# Patient Record
Sex: FEMALE | Race: WHITE | Employment: FULL TIME | URBAN - NONMETROPOLITAN AREA
[De-identification: names, ages, dates, MRNs, and addresses within clinical notes are randomized per-mention and may not be internally consistent; named-entity substitution may affect disease eponyms.]

---
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
## 2020-12-13 ENCOUNTER — APPOINTMENT (OUTPATIENT)
Dept: ULTRASOUND IMAGING | Age: 20
End: 2020-12-13
Payer: COMMERCIAL

## 2020-12-13 ENCOUNTER — APPOINTMENT (OUTPATIENT)
Dept: CT IMAGING | Age: 20
End: 2020-12-13
Payer: COMMERCIAL

## 2020-12-13 ENCOUNTER — HOSPITAL ENCOUNTER (EMERGENCY)
Age: 20
Discharge: HOME | End: 2020-12-13
Payer: COMMERCIAL

## 2020-12-13 VITALS
DIASTOLIC BLOOD PRESSURE: 70 MMHG | HEART RATE: 90 BPM | OXYGEN SATURATION: 100 % | TEMPERATURE: 98.1 F | SYSTOLIC BLOOD PRESSURE: 108 MMHG | RESPIRATION RATE: 16 BRPM

## 2020-12-13 LAB
ALBUMIN SERPL-MCNC: 4.2 G/DL (ref 3.5–5.1)
ALP BLD-CCNC: 66 U/L (ref 38–126)
ALT SERPL-CCNC: 11 U/L (ref 11–66)
ANION GAP SERPL CALCULATED.3IONS-SCNC: 12 MEQ/L (ref 8–16)
AST SERPL-CCNC: 15 U/L (ref 5–40)
BACTERIA: ABNORMAL /HPF
BASOPHILS # BLD: 0.5 %
BASOPHILS ABSOLUTE: 0.1 THOU/MM3 (ref 0–0.1)
BILIRUB SERPL-MCNC: 0.2 MG/DL (ref 0.3–1.2)
BILIRUBIN URINE: NEGATIVE
BLOOD, URINE: ABNORMAL
BUN BLDV-MCNC: 10 MG/DL (ref 7–22)
C-REACTIVE PROTEIN: 0.5 MG/DL (ref 0–1)
CALCIUM SERPL-MCNC: 9.2 MG/DL (ref 8.5–10.5)
CASTS 2: ABNORMAL /LPF
CASTS UA: ABNORMAL /LPF
CHARACTER, URINE: CLEAR
CHLORIDE BLD-SCNC: 105 MEQ/L (ref 98–111)
CO2: 21 MEQ/L (ref 23–33)
COLOR: YELLOW
CREAT SERPL-MCNC: 0.7 MG/DL (ref 0.4–1.2)
CRYSTALS, UA: ABNORMAL
EOSINOPHIL # BLD: 1.3 %
EOSINOPHILS ABSOLUTE: 0.1 THOU/MM3 (ref 0–0.4)
EPITHELIAL CELLS, UA: ABNORMAL /HPF
ERYTHROCYTE [DISTWIDTH] IN BLOOD BY AUTOMATED COUNT: 12.3 % (ref 11.5–14.5)
ERYTHROCYTE [DISTWIDTH] IN BLOOD BY AUTOMATED COUNT: 43.3 FL (ref 35–45)
GFR SERPL CREATININE-BSD FRML MDRD: > 90 ML/MIN/1.73M2
GLUCOSE BLD-MCNC: 79 MG/DL (ref 70–108)
GLUCOSE URINE: NEGATIVE MG/DL
HCT VFR BLD CALC: 43.6 % (ref 37–47)
HEMOGLOBIN: 14.3 GM/DL (ref 12–16)
IMMATURE GRANS (ABS): 0.07 THOU/MM3 (ref 0–0.07)
IMMATURE GRANULOCYTES: 0.6 %
KETONES, URINE: NEGATIVE
LEUKOCYTE ESTERASE, URINE: NEGATIVE
LYMPHOCYTES # BLD: 15.9 %
LYMPHOCYTES ABSOLUTE: 1.8 THOU/MM3 (ref 1–4.8)
MCH RBC QN AUTO: 31.6 PG (ref 26–33)
MCHC RBC AUTO-ENTMCNC: 32.8 GM/DL (ref 32.2–35.5)
MCV RBC AUTO: 96.5 FL (ref 81–99)
MISCELLANEOUS 2: ABNORMAL
MONOCYTES # BLD: 7.3 %
MONOCYTES ABSOLUTE: 0.8 THOU/MM3 (ref 0.4–1.3)
NITRITE, URINE: NEGATIVE
NUCLEATED RED BLOOD CELLS: 0 /100 WBC
OSMOLALITY CALCULATION: 273.6 MOSMOL/KG (ref 275–300)
PH UA: 8 (ref 5–9)
PLATELET # BLD: 251 THOU/MM3 (ref 130–400)
PMV BLD AUTO: 12.2 FL (ref 9.4–12.4)
POTASSIUM REFLEX MAGNESIUM: 4.1 MEQ/L (ref 3.5–5.2)
PREGNANCY, SERUM: NEGATIVE
PROTEIN UA: NEGATIVE
RBC # BLD: 4.52 MILL/MM3 (ref 4.2–5.4)
RBC URINE: ABNORMAL /HPF
RENAL EPITHELIAL, UA: ABNORMAL
SEG NEUTROPHILS: 74.4 %
SEGMENTED NEUTROPHILS ABSOLUTE COUNT: 8.4 THOU/MM3 (ref 1.8–7.7)
SODIUM BLD-SCNC: 138 MEQ/L (ref 135–145)
SPECIFIC GRAVITY, URINE: 1.02 (ref 1–1.03)
TOTAL PROTEIN: 7.2 G/DL (ref 6.1–8)
UROBILINOGEN, URINE: 1 EU/DL (ref 0–1)
WBC # BLD: 11.3 THOU/MM3 (ref 4.8–10.8)
WBC UA: ABNORMAL /HPF
YEAST: ABNORMAL

## 2020-12-13 PROCEDURE — 80053 COMPREHEN METABOLIC PANEL: CPT

## 2020-12-13 PROCEDURE — 76705 ECHO EXAM OF ABDOMEN: CPT

## 2020-12-13 PROCEDURE — 93975 VASCULAR STUDY: CPT

## 2020-12-13 PROCEDURE — 86140 C-REACTIVE PROTEIN: CPT

## 2020-12-13 PROCEDURE — 85025 COMPLETE CBC W/AUTO DIFF WBC: CPT

## 2020-12-13 PROCEDURE — 81001 URINALYSIS AUTO W/SCOPE: CPT

## 2020-12-13 PROCEDURE — 74176 CT ABD & PELVIS W/O CONTRAST: CPT

## 2020-12-13 PROCEDURE — 76856 US EXAM PELVIC COMPLETE: CPT

## 2020-12-13 PROCEDURE — 99283 EMERGENCY DEPT VISIT LOW MDM: CPT

## 2020-12-13 PROCEDURE — 84703 CHORIONIC GONADOTROPIN ASSAY: CPT

## 2020-12-13 PROCEDURE — 36415 COLL VENOUS BLD VENIPUNCTURE: CPT

## 2020-12-13 ASSESSMENT — PAIN SCALES - GENERAL: PAINLEVEL_OUTOF10: 10

## 2020-12-13 ASSESSMENT — ENCOUNTER SYMPTOMS
DIARRHEA: 0
ABDOMINAL PAIN: 1
COLOR CHANGE: 0
BACK PAIN: 0
NAUSEA: 1
VOMITING: 1
CONSTIPATION: 0
COUGH: 0
SORE THROAT: 0

## 2020-12-13 ASSESSMENT — PAIN DESCRIPTION - LOCATION: LOCATION: ABDOMEN

## 2020-12-13 ASSESSMENT — PAIN DESCRIPTION - ORIENTATION: ORIENTATION: RIGHT

## 2020-12-13 NOTE — ED NOTES
Pt currently darlene at [facility], [address] [place] [address]  12/13/20 1914

## 2020-12-13 NOTE — ED TRIAGE NOTES
Pt presents with c/o abd pain in R lower area. She reports pain for 1 week but worse today. She reports 1 episode of emesis today. He did have a normal BM today. She does report some worsening pain in R abd area when urinating. She denies vaginal bleeding or discharge. She denies hx of abd issues. Pt is alert and oriented. Mother at bedside.

## 2020-12-13 NOTE — ED PROVIDER NOTES
[facility]  eMERGENCY dEPARTMENT eNCOUnter          CHIEF COMPLAINT       Chief Complaint   Patient presents with    Abdominal Pain    Emesis       Nurses Notes reviewed and I agree except as noted inthe HPI. HISTORY OF PRESENT ILLNESS    Liz Kinsey is a 21 y.o. female who presents to the Emergency Department for the evaluation of right lower abdominal pain. Patient reports the symptoms have been ongoing for the past week with a constant cramping pain in the right lower quadrant. This morning, she had sudden severe worsening of the pain with associated nausea as well as one episode of emesis. Reports  the severe pain lasted approximately 1 hour and gradually improved over that hour. No history of similar pain in the past.  No history of ovarian cyst diagnosis. She reports the pain is now back at the level that it was over the past week. Pain worsens with movement and improves with rest.  She is not taking any medication for the pain and rates her current pain 2/10. No associated fevers, chills, urinary changes, vaginal bleeding or discharge, constipation or diarrhea. She had a normal bowel movement this morning without change in her pain. Appendix is intact. No history of abdominal surgeries. The HPI was provided by the patient. REVIEW OF SYSTEMS     Review of Systems   Constitutional: Negative for chills and fever. HENT: Negative for sore throat. Respiratory: Negative for cough. Cardiovascular: Negative for chest pain. Gastrointestinal: Positive for abdominal pain, nausea and vomiting. Negative for constipation and diarrhea. Genitourinary: Negative for dysuria, frequency, hematuria, urgency, vaginal bleeding and vaginal discharge. Musculoskeletal: Negative for back pain. Skin: Negative for color change. Neurological: Negative for syncope, weakness and headaches. PAST MEDICAL HISTORY    has no past medical history on file.     SURGICAL HISTORY has no past surgical history on file. CURRENT MEDICATIONS       There are no discharge medications for this patient. ALLERGIES     has No Known Allergies. FAMILY HISTORY     She indicated that her mother is alive. She indicated that her father is alive. family history is not on file. SOCIAL HISTORY      reports that she has never smoked. She has never used smokeless tobacco. She reports that she does not drink alcohol or use drugs. PHYSICAL EXAM     INITIAL VITALS:  oral temperature is 98.1 °F (36.7 °C). Her blood pressure is 108/70 and her pulse is 90. Her respiration is 16 and oxygen saturation is 100%. Physical Exam  Vitals signs and nursing note reviewed. Constitutional:       Appearance: She is well-developed. HENT:      Head: Normocephalic and atraumatic. Cardiovascular:      Rate and Rhythm: Normal rate. Pulmonary:      Effort: Pulmonary effort is normal. No respiratory distress. Abdominal:      Palpations: Abdomen is soft. Tenderness: There is abdominal tenderness in the right lower quadrant and suprapubic area. There is no guarding or rebound. Negative signs include Pemberton's sign and Rovsing's sign. Skin:     General: Skin is warm and dry. Neurological:      General: No focal deficit present. Mental Status: She is alert. Psychiatric:         Mood and Affect: Mood normal.         DIFFERENTIAL DIAGNOSIS:   Differential diagnoses are discussed    DIAGNOSTIC RESULTS     EKG: All EKG's are interpreted by the Emergency Department Physician who either signs or Co-signsthis chart in the absence of a cardiologist.          RADIOLOGY: non-plain film images(s) such as CT, Ultrasound and MRI are read by the radiologist.    CT ABDOMEN PELVIS WO CONTRAST Additional Contrast? None   Final Result   Free fluid in the cul-de-sac likely physiologic. No other acute abdominal or pelvic abnormalities            **This report has been created using voice recognition software.   It may contain minor errors which are inherent in voice recognition technology. **      Final report electronically signed by Dr. Willis Given on 12/13/2020 6:56 PM      [address]   Final Result   No evidence of torsion. Uterus is unremarkable. **This report has been created using voice recognition software. It may contain minor errors which are inherent in voice recognition technology. **      Final report electronically signed by Dr. Willis Given on 12/13/2020 5:15 PM       APPENDIX   Final Result   The appendix is not identified            **This report has been created using voice recognition software. It may contain minor errors which are inherent in voice recognition technology. **      Final report electronically signed by Dr. Willis Given on 12/13/2020 5:12 PM      US DUP ABD PEL RETRO [employer]   Final Result   No evidence of torsion. Uterus is unremarkable. **This report has been created using voice recognition software. It may contain minor errors which are inherent in voice recognition technology. **      Final report electronically signed by Dr. Willis Given on 12/13/2020 5:15 PM          LABS:      Labs Reviewed   CBC WITH AUTO DIFFERENTIAL - Abnormal; Notable for the following components:       Result Value    WBC 11.3 (*)     Segs Absolute 8.4 (*)     All other components within normal limits   COMPREHENSIVE METABOLIC PANEL W/ REFLEX TO MG FOR LOW K - Abnormal; Notable for the following components:    CO2 21 (*)     Total Bilirubin 0.2 (*)     All other components within normal limits   URINE WITH REFLEXED MICRO - Abnormal; Notable for the following components:    Blood, Urine TRACE (*)     All other components within normal limits   OSMOLALITY - Abnormal; Notable for the following components:    Osmolality Calc 273.6 (*)     All other components within normal limits   HCG, SERUM, QUALITATIVE   C-REACTIVE PROTEIN   ANION GAP   GLOMERULAR FILTRATION RATE, ESTIMATED EMERGENCY DEPARTMENT COURSE:   Vitals:    Vitals:    12/13/20 1400 12/13/20 1700 12/13/20 1724 12/13/20 1912   BP: 100/76 123/84  108/70   Pulse: 85  95 90   Resp: 16  16 16   Temp:       TempSrc:       SpO2: 99%  98% 100%      2:51 PM EST: The patient was seen and evaluated. Patient presents for complaints of abdominal pain. Reports 1 week of pain in the right lower abdomen that suddenly worsened earlier today, with more severe pain for approximately 1 hour, gradually tapering off during that hour and now back to the baseline that was over the past week. She is sexually active, denies any concern or desire for evaluation of STI. She has reassuring labs other than mildly elevated white blood cell count at 11,300 and trace blood in the urine. Initial pelvic ultrasound did not show any acute abnormality. Appendix was not visualized on ultrasound. Due to the hematuria, CT was obtained to rule out kidney stone and this was also without any acute abnormality. Discussed with the patient and mother at bedside. They feel comfortable with discharge home and continued outpatient follow-up. Encouraged the patient to have pelvic exam and swabs done should her symptoms persist though we did also discuss the possibility of a ruptured ovarian cyst having caused her severe pain earlier as there was a small amount of free fluid in the pelvis noted on abdominal CT. Return precautions were discussed and they denied further needs upon discharge. CRITICAL CARE:   None    CONSULTS:  None    PROCEDURES:  None    FINAL IMPRESSION      1. Abdominal pain, right lower quadrant          DISPOSITION/PLAN   Discharge    PATIENT REFERRED TO:  Tg Driscoll MD  [address] [place] [address] [facility],[address] [facility]  [phone]      As needed, if vaginal discharge or pelvic pain develop    [facility] [place] [address],[address]  [address] [address] [facility] [address] [facility]  Call   As needed if abdominal

## 2020-12-13 NOTE — ED NOTES
Pt resting on cart with side rails up.  She was updated on poc and zuleyka Stafford RN  12/13/20 7016

## 2020-12-14 NOTE — ED NOTES
Upon first contact pt resting in bed. Awaiting d/c. Will monitor.      Vidya Muñoz RN  12/13/20 0145